# Patient Record
Sex: FEMALE | Race: WHITE | NOT HISPANIC OR LATINO | URBAN - NONMETROPOLITAN AREA
[De-identification: names, ages, dates, MRNs, and addresses within clinical notes are randomized per-mention and may not be internally consistent; named-entity substitution may affect disease eponyms.]

---

## 2021-02-16 ENCOUNTER — IMPORTED ENCOUNTER (OUTPATIENT)
Dept: URBAN - NONMETROPOLITAN AREA CLINIC 1 | Facility: CLINIC | Age: 36
End: 2021-02-16

## 2021-02-16 PROBLEM — H11.31: Noted: 2021-02-16

## 2021-02-16 PROBLEM — H20.011: Noted: 2021-02-16

## 2021-02-16 PROCEDURE — 99203 OFFICE O/P NEW LOW 30 MIN: CPT

## 2021-02-16 NOTE — PATIENT DISCUSSION
[facility] healing laceration on Conj - Diacussed diagnosis in detail with patient - Cell and conjunctival laceration noted today OD - Start Tobradex TID OD Rx sent to pharmacy- Continue to monitor - RTC Friday for F/U

## 2021-04-05 ENCOUNTER — IMPORTED ENCOUNTER (OUTPATIENT)
Dept: URBAN - NONMETROPOLITAN AREA CLINIC 1 | Facility: CLINIC | Age: 36
End: 2021-04-05

## 2021-04-05 PROBLEM — Z01.00: Noted: 2021-04-05

## 2021-04-05 PROBLEM — H25.13: Noted: 2021-04-05

## 2021-04-05 PROCEDURE — 92015 DETERMINE REFRACTIVE STATE: CPT

## 2021-04-05 PROCEDURE — 92014 COMPRE OPH EXAM EST PT 1/>: CPT

## 2021-04-05 NOTE — PATIENT DISCUSSION
Normal Routine [facility] OU - Discussed diagnosis in detail with patient- No glasses RX given today - Continue to monitor- RTC 1 year [facility] OU- Discussed diagnosis in detail with patient- Discussed signs and symptoms of progression- Discussed UV protection- No treatment needed at this time- Continue to monitor

## 2022-04-09 ASSESSMENT — TONOMETRY
OS_IOP_MMHG: 15
OD_IOP_MMHG: 15
OD_IOP_MMHG: 16
OS_IOP_MMHG: 16

## 2022-04-09 ASSESSMENT — VISUAL ACUITY
OD_CC: 20/20
OS_CC: 20/20
OS_CC: 20/20
OD_CC: 20/20

## 2025-03-06 ENCOUNTER — EMERGENCY VISIT (OUTPATIENT)
Age: 40
End: 2025-03-06

## 2025-03-06 DIAGNOSIS — H04.123: ICD-10-CM

## 2025-03-06 PROCEDURE — 99203 OFFICE O/P NEW LOW 30 MIN: CPT

## 2025-03-25 ENCOUNTER — COMPREHENSIVE EXAM (OUTPATIENT)
Age: 40
End: 2025-03-25

## 2025-03-25 DIAGNOSIS — H52.01: ICD-10-CM

## 2025-03-25 PROCEDURE — 92014 COMPRE OPH EXAM EST PT 1/>: CPT

## 2025-03-25 PROCEDURE — 92015 DETERMINE REFRACTIVE STATE: CPT
